# Patient Record
(demographics unavailable — no encounter records)

---

## 2024-10-28 NOTE — PHYSICAL EXAM
[Well Developed] : well developed [Well Nourished] : well nourished [No Acute Distress] : no acute distress [Normal Conjunctiva] : normal conjunctiva [Normal Venous Pressure] : normal venous pressure [No Carotid Bruit] : no carotid bruit [5th Left ICS - MCL] : palpated at the 5th LICS in the midclavicular line [Normal] : normal [No Precordial Heave] : no precordial heave was noted [Normal Rate] : normal [Rhythm Regular] : regular [Normal S1] : normal S1 [Normal S2] : normal S2 [No Murmur] : no murmurs heard [___ +] : bilateral [unfilled]U+ pretibial pitting edema [2+] : left 2+ [Good Air Entry] : good air entry [No Respiratory Distress] : no respiratory distress  [Ezequiel ___] : ezequiel MartinezV [Soft] : abdomen soft [Non Tender] : non-tender [No Masses/organomegaly] : no masses/organomegaly [Normal Bowel Sounds] : normal bowel sounds [Normal Gait] : normal gait [No Cyanosis] : no cyanosis [No Clubbing] : no clubbing [No Varicosities] : no varicosities [No Rash] : no rash [No Skin Lesions] : no skin lesions [Moves all extremities] : moves all extremities [No Focal Deficits] : no focal deficits [Normal Speech] : normal speech [Alert and Oriented] : alert and oriented [Normal memory] : normal memory

## 2024-10-29 NOTE — HISTORY OF PRESENT ILLNESS
[FreeTextEntry1] : Mr. Burgess is a 93yo M with HFpEF, NICM with recovered EF, nonobstructive CAD, HTN, HLD, DM, CHB s/p CRT-P, AF who presents for follow up. His PMD is Dr. Azam Monae and EP Dr. Mikey Doll. He is still getting breathless at times. He was offered DCCV again but declines at this time. He feels well enough.  10/24/23-Patient is feeling well. Some SOB with heavy activity. Waiting on stairlift.  02/20/24-Patient feeling well. No new complaints. He denies worsening SOB. He still does not want DCCV for AF.  06/13/24-Patient feeling well. His wife passed away about 2 weeks ago.  10/01/24-Patient with worsening SOB. Treated for possible respiratory infection but still feeling SOB. Only able to walk about 20 feet before becoming SOB.  10/29/24-Patient with improvement in SOB. Still fatigued with minimal exertion. LE swelling still present, but improved.

## 2024-10-29 NOTE — ASSESSMENT
[FreeTextEntry1] : SOB: Likely mild HFrEF exacerbation. -Pt had been skipping bumex doses and also had salty foods. -Pt to take bumex 1mg PO BID consistently.  -Will increase bumex 2mg PO BID for 3 days.  -[facility] labs next week.     HFrEF: Now recovered. EF 56%. S/P CRT-P -Continue with entresto 24/26mg, Toprol 100mg PO daily and bumex 1mg PO BID. -Continue with home weights and monitoring for swelling. -Pt knows to take extra bumex if swelling worsens. -Continue to follow with EP. -ZAK/DCCV offered for SOB, but would like to defer at this time.  AF: Currently in AF. Paced. -Continue with Eliquis 2.5mg PO BID and Toprol 100mg PO daily. -Amiodarone discontinued due to staying in AF.   HTN: BP at goal per ACC/AHA 2018 guidelines -Continue with entresto and toprol.  HLD: , TG 72, HDL 54, LDL 53 (06/24) -Continue with atorvastatin.  DM: HA1c 6.8% (06/24) -Continue with alogliptin.   AS: Moderate -Continue to monitor with serial TTE.   Follow up in January.

## 2024-10-29 NOTE — REASON FOR VISIT
[Other: ____] : [unfilled] [FreeTextEntry1] : Diagnostic Tests: --------------------- EK24-V-paced at 60 bpm.  23-Ventricular paced rhythm. Underlying AF.  --------------------- Echo:  24-TTE: EF 54%. Basal-mid anterolateral, basal-mid inferolateral hypokinesis. Severe LAE. Mod AS, mild-mod AI. Mod MR. Aortic arch 3.8 cm.  23-TTE: EF 56%. Moderate LVH. Severe LAE. Mild AS, AI. Mod MR, TR. PASP 48 mmHg.  21-TTE: EF 50-55%. Mod LAE. Mild AI. Mod MR, TR. Mild PI.  PASP 54 mmHg.  -------------------- Stress:  21-Adenosine MPI: Moderate sized sever distal anteroapical defect with partial to near complete reversibility with dilated LV. EF 40-45%.  -------------------- Cath:  22-LHC: LM none, LAD mild, LCx mild, OM1 mild, Ramus none, RCA mild.

## 2025-01-16 NOTE — HISTORY OF PRESENT ILLNESS
[FreeTextEntry1] : Mr. Burgess is a 91yo M with HFpEF, NICM with recovered EF, nonobstructive CAD, HTN, HLD, DM, CHB s/p CRT-P, AF who presents for follow up. His PMD is Dr. Azam Monae and EP Dr. Mikey Doll. He is still getting breathless at times. He was offered DCCV again but declines at this time. He feels well enough.  10/24/23-Patient is feeling well. Some SOB with heavy activity. Waiting on stairlift.  02/20/24-Patient feeling well. No new complaints. He denies worsening SOB. He still does not want DCCV for AF.  06/13/24-Patient feeling well. His wife passed away about 2 weeks ago.  10/01/24-Patient with worsening SOB. Treated for possible respiratory infection but still feeling SOB. Only able to walk about 20 feet before becoming SOB.  10/29/24-Patient with improvement in SOB. Still fatigued with minimal exertion. LE swelling still present, but improved.  01/16/25-Patient with LLE wound and treated by vascular doctor. He also has left hand swelling and discoloration. Will be seen by vascular doctor tomorrow.

## 2025-01-16 NOTE — PHYSICAL EXAM
[Well Developed] : well developed [Well Nourished] : well nourished [No Acute Distress] : no acute distress [Normal Conjunctiva] : normal conjunctiva [Normal Venous Pressure] : normal venous pressure [No Carotid Bruit] : no carotid bruit [5th Left ICS - MCL] : palpated at the 5th LICS in the midclavicular line [Normal] : normal [No Precordial Heave] : no precordial heave was noted [Normal Rate] : normal [Rhythm Regular] : regular [Normal S1] : normal S1 [Normal S2] : normal S2 [No Murmur] : no murmurs heard [___ +] : bilateral [unfilled]U+ pretibial pitting edema [2+] : left 2+ [Good Air Entry] : good air entry [No Respiratory Distress] : no respiratory distress  [Ezequiel ___] : ezequiel MartinezV [Soft] : abdomen soft [Non Tender] : non-tender [No Masses/organomegaly] : no masses/organomegaly [Normal Bowel Sounds] : normal bowel sounds [Normal Gait] : normal gait [No Cyanosis] : no cyanosis [No Clubbing] : no clubbing [No Varicosities] : no varicosities [No Rash] : no rash [No Skin Lesions] : no skin lesions [Moves all extremities] : moves all extremities [No Focal Deficits] : no focal deficits [Normal Speech] : normal speech [Alert and Oriented] : alert and oriented [Normal memory] : normal memory [de-identified] : Left hand swelling with dusky appearance. 2+ radial and ulnar pulses. Cool to touch.

## 2025-01-16 NOTE — REASON FOR VISIT
[Other: ____] : [unfilled] [FreeTextEntry1] : Diagnostic Tests: --------------------- EK25-V-paced at 63 bpm.  24-V-paced at 60 bpm.  23-Ventricular paced rhythm. Underlying AF.  --------------------- Echo:  24-TTE: EF 54%. Basal-mid anterolateral, basal-mid inferolateral hypokinesis. Severe LAE. Mod AS, mild-mod AI. Mod MR. Aortic arch 3.8 cm.  23-TTE: EF 56%. Moderate LVH. Severe LAE. Mild AS, AI. Mod MR, TR. PASP 48 mmHg.  21-TTE: EF 50-55%. Mod LAE. Mild AI. Mod MR, TR. Mild PI.  PASP 54 mmHg.  -------------------- Stress:  21-Adenosine MPI: Moderate sized sever distal anteroapical defect with partial to near complete reversibility with dilated LV. EF 40-45%.  -------------------- Cath:  22-LHC: LM none, LAD mild, LCx mild, OM1 mild, Ramus none, RCA mild.

## 2025-02-13 NOTE — ASSESSMENT
[FreeTextEntry1] : SOB: Likely mild HFrEF exacerbation. -Continue bumex 2mg PO BID.  -Add metolazone 2.5mg PO daily.  -Recommended patient go to ER for further evaluation and IV diuresis.  -Patient and son would like to wait until EP consultation next week for possible DCCV eval. -Discussed if symptoms worsen to go to ED.   HFrEF: Now recovered. EF 56%. S/P CRT-P -Continue with entresto 24/26mg, Toprol 100mg PO daily and bumex 1mg PO BID. -Continue with home weights and monitoring for swelling. -Pt knows to take extra bumex if swelling worsens. -Continue to follow with EP.  AF: Currently in AF. Paced. -Continue with Eliquis 2.5mg PO BID and Toprol 100mg PO daily. -Amiodarone discontinued due to staying in AF.   HTN: BP at goal per ACC/AHA 2018 guidelines -Continue with entresto and toprol.  HLD: , TG 72, HDL 54, LDL 53 (06/24) -Continue with atorvastatin.  DM: HA1c 6.8% (06/24) -Continue with alogliptin.   AS: Moderate -Continue to monitor with serial TTE.   Follow up in 6-8 weeks

## 2025-02-13 NOTE — REASON FOR VISIT
[Other: ____] : [unfilled] [FreeTextEntry1] : Diagnostic Tests: --------------------- EK25-V-paced at 68 bpm. PVCs.  25-V-paced at 63 bpm.  24-V-paced at 60 bpm.  23-Ventricular paced rhythm. Underlying AF.  --------------------- Echo:  24-TTE: EF 54%. Basal-mid anterolateral, basal-mid inferolateral hypokinesis. Severe LAE. Mod AS, mild-mod AI. Mod MR. Aortic arch 3.8 cm.  23-TTE: EF 56%. Moderate LVH. Severe LAE. Mild AS, AI. Mod MR, TR. PASP 48 mmHg.  21-TTE: EF 50-55%. Mod LAE. Mild AI. Mod MR, TR. Mild PI.  PASP 54 mmHg.  -------------------- Stress:  21-Adenosine MPI: Moderate sized sever distal anteroapical defect with partial to near complete reversibility with dilated LV. EF 40-45%.  -------------------- Cath:  22-LHC: LM none, LAD mild, LCx mild, OM1 mild, Ramus none, RCA mild.

## 2025-02-13 NOTE — PHYSICAL EXAM
[Well Developed] : well developed [Well Nourished] : well nourished [No Acute Distress] : no acute distress [Normal Conjunctiva] : normal conjunctiva [Normal Venous Pressure] : normal venous pressure [No Carotid Bruit] : no carotid bruit [5th Left ICS - MCL] : palpated at the 5th LICS in the midclavicular line [Normal] : normal [No Precordial Heave] : no precordial heave was noted [Normal Rate] : normal [Rhythm Regular] : regular [Normal S1] : normal S1 [Normal S2] : normal S2 [No Murmur] : no murmurs heard [___ +] : bilateral [unfilled]U+ pretibial pitting edema [2+] : left 2+ [Good Air Entry] : good air entry [No Respiratory Distress] : no respiratory distress  [Ezequiel ___] : ezequiel MartinezV [Soft] : abdomen soft [Non Tender] : non-tender [No Masses/organomegaly] : no masses/organomegaly [Normal Bowel Sounds] : normal bowel sounds [Normal Gait] : normal gait [No Cyanosis] : no cyanosis [No Clubbing] : no clubbing [No Varicosities] : no varicosities [No Rash] : no rash [No Skin Lesions] : no skin lesions [Moves all extremities] : moves all extremities [No Focal Deficits] : no focal deficits [Normal Speech] : normal speech [Alert and Oriented] : alert and oriented [Normal memory] : normal memory [de-identified] : Left hand swelling with dusky appearance. 2+ radial and ulnar pulses. Cool to touch.

## 2025-02-13 NOTE — HISTORY OF PRESENT ILLNESS
[FreeTextEntry1] : Mr. Burgess is a 93yo M with HFpEF, NICM with recovered EF, nonobstructive CAD, HTN, HLD, DM, CHB s/p CRT-P, AF who presents for follow up. His PMD is Dr. Azam Monae and EP Dr. Mikey Doll. He is still getting breathless at times. He was offered DCCV again but declines at this time. He feels well enough.  10/24/23-Patient is feeling well. Some SOB with heavy activity. Waiting on stairlift.  02/20/24-Patient feeling well. No new complaints. He denies worsening SOB. He still does not want DCCV for AF.  06/13/24-Patient feeling well. His wife passed away about 2 weeks ago.  10/01/24-Patient with worsening SOB. Treated for possible respiratory infection but still feeling SOB. Only able to walk about 20 feet before becoming SOB.  10/29/24-Patient with improvement in SOB. Still fatigued with minimal exertion. LE swelling still present, but improved.  01/16/25-Patient with LLE wound and treated by vascular doctor. He also has left hand swelling and discoloration. Will be seen by vascular doctor tomorrow.  02/13/25-Patient still with LE swelling and SOB.